# Patient Record
Sex: FEMALE | Race: WHITE | NOT HISPANIC OR LATINO | Employment: UNEMPLOYED | URBAN - METROPOLITAN AREA
[De-identification: names, ages, dates, MRNs, and addresses within clinical notes are randomized per-mention and may not be internally consistent; named-entity substitution may affect disease eponyms.]

---

## 2017-11-06 ENCOUNTER — TELEPHONE (OUTPATIENT)
Dept: CARDIOLOGY | Facility: MEDICAL CENTER | Age: 57
End: 2017-11-06

## 2017-11-07 NOTE — TELEPHONE ENCOUNTER
I called [facility] at [phone] and left a voicemail for the medical assistant for Ramona re: labs, EKG or any other cardiac work up records for the referral. A EKG was done in their office, but not sent with the referral. I asked the medical assistant to call me back.  I did call the patient too and left a generic voicemail asking for a call back to see if the patient had seen a cardiologist in the past. The patient is seeing  11/10/17 @ 1400.SAH.

## 2017-11-08 NOTE — TELEPHONE ENCOUNTER
[facility] called me backMARS for Kelly MARTE and he will send any additional records for the patient's visit with BRIAN.

## 2017-11-08 NOTE — TELEPHONE ENCOUNTER
I called the patient at [phone] (H) and left a generic voicemail for the patient to confirm her appointment with CR on 11/10 @ 1400 and to see if the patient had recent labs done.MARIA ESTHER

## 2017-11-10 ENCOUNTER — OFFICE VISIT (OUTPATIENT)
Dept: CARDIOLOGY | Facility: MEDICAL CENTER | Age: 57
End: 2017-11-10
Payer: MEDICAID

## 2017-11-10 VITALS
HEIGHT: 65 IN | HEART RATE: 92 BPM | DIASTOLIC BLOOD PRESSURE: 82 MMHG | SYSTOLIC BLOOD PRESSURE: 114 MMHG | OXYGEN SATURATION: 97 % | WEIGHT: 121 LBS | BODY MASS INDEX: 20.16 KG/M2 | RESPIRATION RATE: 12 BRPM

## 2017-11-10 DIAGNOSIS — R55 SYNCOPE AND COLLAPSE: ICD-10-CM

## 2017-11-10 LAB — EKG IMPRESSION: NORMAL

## 2017-11-10 PROCEDURE — 99244 OFF/OP CNSLTJ NEW/EST MOD 40: CPT | Performed by: INTERNAL MEDICINE

## 2017-11-10 PROCEDURE — 93000 ELECTROCARDIOGRAM COMPLETE: CPT | Performed by: INTERNAL MEDICINE

## 2017-11-10 ASSESSMENT — ENCOUNTER SYMPTOMS
EYE PAIN: 0
MYALGIAS: 0
HEMOPTYSIS: 0
WHEEZING: 0
TINGLING: 1
NERVOUS/ANXIOUS: 0
BLURRED VISION: 0
SPEECH CHANGE: 0
LOSS OF CONSCIOUSNESS: 0
COUGH: 0
NAUSEA: 0
PALPITATIONS: 0
CHILLS: 0
FEVER: 0
BRUISES/BLEEDS EASILY: 0
EYE DISCHARGE: 0
ABDOMINAL PAIN: 0
INSOMNIA: 1
VOMITING: 0
DEPRESSION: 1

## 2017-11-10 NOTE — PROGRESS NOTES
Subjective:   Jennifer Fox is a 57 y.o. female who presents today for evaluation of syncope    She is seen in consultation at the request of Kristel Mendoza PA-C at [facility] for above issue.    She has depression but other no known major medical issue.    About 5-6 months ago, she had a syncopal episode without much warning.  She woke up on the floor with some headache.  She did not recall palpotations, chest pain, incontinence or other focal weakness or numbness.  There has been no recurrence since.   Unsure if she was taking antidepressant at the time.    She was seen at [facility] last month and referred here.    She is mostly concerned about tingling sensation in her head.    There is no FH of sudden death.  Maternal grandmother had MI but later in life.    Her mother is present during this visit. She denies any cardiac issue.    Taking no medication currently.    Past Medical History:   Diagnosis Date   • Depression    • Migraine      History reviewed. No pertinent surgical history.  History reviewed. No pertinent family history.  History   Smoking Status   • Current Every Day Smoker   • Packs/day: 0.50   Smokeless Tobacco   • Never Used     No Known Allergies  Outpatient Encounter Prescriptions as of 11/10/2017   Medication Sig Dispense Refill   • hydrocodone-acetaminophen (VICODIN ES) 7.5-750 MG per tablet Take 1 Tab by mouth every four hours as needed for Mild Pain (pain). (Patient not taking: Reported on 11/10/2017) 15 Each 0   • ondansetron (ZOFRAN) 4 MG TABS Take 1 Tab by mouth every 8 hours as needed for Nausea/Vomiting. (Patient not taking: Reported on 11/10/2017) 10 Each 1     No facility-administered encounter medications on file as of 11/10/2017.      Review of Systems   Constitutional: Negative for chills and fever.   HENT: Negative for congestion.    Eyes: Negative for blurred vision, pain and discharge.   Respiratory: Negative for cough, hemoptysis and wheezing.    Cardiovascular: Negative  "for chest pain and palpitations.   Gastrointestinal: Negative for abdominal pain, nausea and vomiting.   Musculoskeletal: Negative for joint pain and myalgias.   Skin: Negative for itching and rash.   Neurological: Positive for tingling. Negative for speech change and loss of consciousness.        Left top portion of her head   Endo/Heme/Allergies: Does not bruise/bleed easily.   Psychiatric/Behavioral: Positive for depression. The patient has insomnia. The patient is not nervous/anxious.    All other systems reviewed and are negative.       Objective:   /82   Pulse 92   Resp 12   Ht 1.651 m (5' 5\")   Wt 54.9 kg (121 lb)   SpO2 97%   BMI 20.14 kg/m²     Physical Exam   Constitutional: She is oriented to person, place, and time. No distress.   Thin, NAD   HENT:   Mouth/Throat: Mucous membranes are normal.   Eyes: Conjunctivae and EOM are normal.   Neck: No JVD present. No tracheal deviation present. No thyroid mass and no thyromegaly present.   Cardiovascular: Normal rate, regular rhythm and intact distal pulses.    No murmur heard.  Pulmonary/Chest: Effort normal and breath sounds normal. No respiratory distress. She exhibits no tenderness.   Abdominal: Soft. There is no tenderness.   Musculoskeletal: Normal range of motion. She exhibits no edema.   Neurological: She is alert and oriented to person, place, and time. She has normal strength. She displays no tremor.   Skin: Skin is warm and dry. She is not diaphoretic.   Psychiatric: She has a normal mood and affect. Her behavior is normal.   Vitals reviewed.    EKG today by my review showed sinus rhythm, probable right atrial enlargement, QTc 426    Assessment:     1. Syncope and collapse  EKG    EKG    ECHOCARDIOGRAM COMP W/O CONT       Medical Decision Making:  Today's Assessment / Status / Plan:     She is poor historian. She has not had recurrent syncope or dizziness.  EKG unremarkable. QTc is normal.  Will obtain echocardiography.  Will consider " further testing if it recurs.  Advised to f/u with primary care regarding her tingling symptoms.  We will keep you posted about our findings and further recommendations as they become available. Please also do not hesitate to call for any questions.  Thank you kindly for allowing me to participate in the care of this patient.

## 2017-11-10 NOTE — LETTER
[facility]   [address] [place] [address] [address]  [place] [address]  Phone: [phone]  Fax: [phone]              Jennifer Fox  1960    Encounter Date: 11/10/2017    Heather Miguel M.D.          PROGRESS NOTE:  No notes on file      No Recipients

## 2019-06-23 ENCOUNTER — OFFICE VISIT (OUTPATIENT)
Dept: URGENT CARE | Facility: CLINIC | Age: 59
End: 2019-06-23
Payer: MEDICAID

## 2019-06-23 VITALS
SYSTOLIC BLOOD PRESSURE: 120 MMHG | HEART RATE: 82 BPM | WEIGHT: 112 LBS | BODY MASS INDEX: 20.61 KG/M2 | HEIGHT: 62 IN | DIASTOLIC BLOOD PRESSURE: 70 MMHG | OXYGEN SATURATION: 99 % | RESPIRATION RATE: 12 BRPM | TEMPERATURE: 97.7 F

## 2019-06-23 DIAGNOSIS — G43.109 MIGRAINE WITH AURA AND WITHOUT STATUS MIGRAINOSUS, NOT INTRACTABLE: Primary | ICD-10-CM

## 2019-06-23 PROCEDURE — 99214 OFFICE O/P EST MOD 30 MIN: CPT | Performed by: PHYSICIAN ASSISTANT

## 2019-06-23 RX ORDER — ONDANSETRON 2 MG/ML
4 INJECTION INTRAMUSCULAR; INTRAVENOUS ONCE
Status: COMPLETED | OUTPATIENT
Start: 2019-06-23 | End: 2019-06-23

## 2019-06-23 RX ORDER — KETOROLAC TROMETHAMINE 30 MG/ML
30 INJECTION, SOLUTION INTRAMUSCULAR; INTRAVENOUS ONCE
Status: COMPLETED | OUTPATIENT
Start: 2019-06-23 | End: 2019-06-23

## 2019-06-23 RX ORDER — ONDANSETRON 4 MG/1
4 TABLET, FILM COATED ORAL EVERY 4 HOURS PRN
Qty: 20 TAB | Refills: 0 | Status: SHIPPED | OUTPATIENT
Start: 2019-06-23 | End: 2019-06-28

## 2019-06-23 RX ORDER — IBUPROFEN 200 MG
200 TABLET ORAL EVERY 6 HOURS PRN
COMMUNITY
End: 2019-06-23

## 2019-06-23 RX ORDER — DIPHENHYDRAMINE HYDROCHLORIDE 50 MG/ML
25 INJECTION INTRAMUSCULAR; INTRAVENOUS ONCE
Status: COMPLETED | OUTPATIENT
Start: 2019-06-23 | End: 2019-06-23

## 2019-06-23 RX ADMIN — DIPHENHYDRAMINE HYDROCHLORIDE 25 MG: 50 INJECTION INTRAMUSCULAR; INTRAVENOUS at 13:06

## 2019-06-23 RX ADMIN — KETOROLAC TROMETHAMINE 30 MG: 30 INJECTION, SOLUTION INTRAMUSCULAR; INTRAVENOUS at 13:07

## 2019-06-23 RX ADMIN — ONDANSETRON 4 MG: 2 INJECTION INTRAMUSCULAR; INTRAVENOUS at 13:07

## 2019-06-23 ASSESSMENT — ENCOUNTER SYMPTOMS
VOMITING: 1
EYE REDNESS: 0
COUGH: 0
HEADACHES: 1
NAUSEA: 1
SORE THROAT: 0
PHOTOPHOBIA: 1
ABDOMINAL PAIN: 0
MIGRAINE HEADACHES: 1
EYE DISCHARGE: 0
ANOREXIA: 0
SINUS PRESSURE: 1
CONSTIPATION: 0
SHORTNESS OF BREATH: 0
DOUBLE VISION: 0
EYE PAIN: 0
DIARRHEA: 0
BLURRED VISION: 0
FEVER: 0
CHILLS: 0

## 2019-06-23 NOTE — PROGRESS NOTES
Subjective:   Jennifer Fox is a 58 y.o. female who presents for Migraine (x 5 days. Migraine and pain radiating to L side of neck. )        Headache    This is a new problem. Episode onset: 5 days. The problem occurs intermittently. The problem has been waxing and waning. The pain is located in the left unilateral region. The pain quality is similar to prior headaches. The pain is moderate. Associated symptoms include ear pain, hearing loss, nausea, photophobia, sinus pressure and vomiting. Pertinent negatives include no abdominal pain, anorexia, blurred vision, coughing, eye pain, eye redness, fever, phonophobia or sore throat. Treatments tried: ibuprofen 1000mg Q4 hours  Her past medical history is significant for migraine headaches and sinus disease. There is no history of obesity or recent head traumas.     Review of Systems   Constitutional: Negative for chills, fever and malaise/fatigue.   HENT: Positive for ear pain, hearing loss and sinus pressure. Negative for sore throat.    Eyes: Positive for photophobia. Negative for blurred vision, double vision, pain, discharge and redness.   Respiratory: Negative for cough and shortness of breath.    Gastrointestinal: Positive for nausea and vomiting. Negative for abdominal pain, anorexia, constipation and diarrhea.   Neurological: Positive for headaches.   All other systems reviewed and are negative.      PMH:  has a past medical history of Depression and Migraine. She also has no past medical history of Diabetes (HCC); Hyperlipidemia; or Hypertension.  MEDS:   Current Outpatient Prescriptions:   •  ondansetron (ZOFRAN) 4 MG Tab tablet, Take 1 Tab by mouth every four hours as needed for Nausea/Vomiting for up to 5 days., Disp: 20 Tab, Rfl: 0  ALLERGIES: No Known Allergies  SURGHX: History reviewed. No pertinent surgical history.  SOCHX:  reports that she has been smoking.  She has been smoking about 0.50 packs per day. She has never used smokeless tobacco. She  "reports that she uses drugs, including Marijuana. She reports that she does not drink alcohol.  Family History   Problem Relation Age of Onset   • Heart Attack Maternal Grandmother    • Hypertension Maternal Grandmother         Objective:   /70   Pulse 82   Temp 36.5 °C (97.7 °F) (Temporal)   Resp 12   Ht 1.575 m (5' 2\")   Wt 50.8 kg (112 lb)   SpO2 99%   BMI 20.49 kg/m²     Physical Exam   Constitutional: She is oriented to person, place, and time. She appears well-developed and well-nourished. No distress.   HENT:   Head: Normocephalic and atraumatic.   Right Ear: External ear normal.   Left Ear: External ear normal.   Nose: Nose normal.   Mouth/Throat: Uvula is midline. Abnormal dentition.   Eyes: Pupils are equal, round, and reactive to light. Conjunctivae and EOM are normal.   Neck: Normal range of motion. Neck supple. No tracheal deviation present.   Cardiovascular: Normal rate and regular rhythm.    Pulmonary/Chest: Effort normal and breath sounds normal.   Neurological: She is alert and oriented to person, place, and time. She has normal strength. She is not disoriented. She displays no atrophy. No cranial nerve deficit or sensory deficit. She exhibits normal muscle tone. Coordination normal.   Skin: Skin is warm and dry. Capillary refill takes less than 2 seconds.   Psychiatric: She has a normal mood and affect. Her behavior is normal.   Vitals reviewed.        Assessment/Plan:     1. Migraine with aura and without status migrainosus, not intractable  ketorolac (TORADOL) injection 30 mg    ondansetron (ZOFRAN) syringe/vial injection 4 mg    diphenhydrAMINE (BENADRYL) injection 25 mg    ondansetron (ZOFRAN) 4 MG Tab tablet     Toradol, diphenhydramine, Zofran administered in office.  Patient placed in a cool dark room 15 minutes.  Symptoms slightly improved.  Advised patient to return home and rest in a cool dark room.  Educational handout on migraine provided.    Follow-up with primary care " provider within 7-10 days.  If symptoms worsen or persist patient can return to clinic immediately for reevaluation.  Red flags and strict emergency room precautions discussed. All side effects of medication discussed including allergic response, GI upset, tendon injury, etc. Patient and mother verbalized understanding of information.    Please note that this dictation was created using voice recognition software. I have made every reasonable attempt to correct obvious errors, but I expect that there are errors of grammar and possibly content that I did not discover before finalizing the note.

## 2023-02-13 ENCOUNTER — APPOINTMENT (OUTPATIENT)
Dept: MEDICAL GROUP | Facility: CLINIC | Age: 63
End: 2023-02-13
Payer: MEDICAID

## 2023-03-27 ENCOUNTER — OFFICE VISIT (OUTPATIENT)
Dept: MEDICAL GROUP | Facility: CLINIC | Age: 63
End: 2023-03-27
Payer: MEDICAID

## 2023-03-27 DIAGNOSIS — H53.8 BLURRY VISION, BILATERAL: ICD-10-CM

## 2023-03-27 DIAGNOSIS — Z11.59 ENCOUNTER FOR HEPATITIS C SCREENING TEST FOR LOW RISK PATIENT: ICD-10-CM

## 2023-03-27 DIAGNOSIS — R45.89 DEPRESSED MOOD: ICD-10-CM

## 2023-03-27 DIAGNOSIS — Z13.220 SCREENING FOR LIPID DISORDERS: ICD-10-CM

## 2023-03-27 PROCEDURE — 99213 OFFICE O/P EST LOW 20 MIN: CPT | Mod: GE

## 2023-03-27 ASSESSMENT — ANXIETY QUESTIONNAIRES
1. FEELING NERVOUS, ANXIOUS, OR ON EDGE: SEVERAL DAYS
6. BECOMING EASILY ANNOYED OR IRRITABLE: SEVERAL DAYS
2. NOT BEING ABLE TO STOP OR CONTROL WORRYING: MORE THAN HALF THE DAYS
7. FEELING AFRAID AS IF SOMETHING AWFUL MIGHT HAPPEN: SEVERAL DAYS
GAD7 TOTAL SCORE: 7
3. WORRYING TOO MUCH ABOUT DIFFERENT THINGS: SEVERAL DAYS
5. BEING SO RESTLESS THAT IT IS HARD TO SIT STILL: NOT AT ALL
IF YOU CHECKED OFF ANY PROBLEMS ON THIS QUESTIONNAIRE, HOW DIFFICULT HAVE THESE PROBLEMS MADE IT FOR YOU TO DO YOUR WORK, TAKE CARE OF THINGS AT HOME, OR GET ALONG WITH OTHER PEOPLE: VERY DIFFICULT
4. TROUBLE RELAXING: SEVERAL DAYS

## 2023-03-27 ASSESSMENT — PATIENT HEALTH QUESTIONNAIRE - PHQ9
5. POOR APPETITE OR OVEREATING: 0 - NOT AT ALL
SUM OF ALL RESPONSES TO PHQ QUESTIONS 1-9: 5
CLINICAL INTERPRETATION OF PHQ2 SCORE: 2

## 2023-03-27 NOTE — PROGRESS NOTES
"Subjective:     CC:   Chief Complaint   Patient presents with    Annual Exam     Bad Headaches when stressed, gets dizzy, mood swings, wants to get a diabetic test done.       HPI:   Jennifer presents today to establish care:    #Blurry Vision   Patient has had progressively worsening vision over the last 3 years. Went to eye doctor, told she had worsening vision and should get diabetes test. No curtains, flashes of lights, floaters, complete loss of vision.    #Depression  #Anxiety  Patient states she will sleep a lot. She lays in bed in order not to cope with stresses of encountering people. She feels like she is all alone as her   10 years ago, father has passed away, brother passed away 1 year ago. Has tried medication in the past for depression and sleep in the past. Has had SI in the past but no active SI currently. Some passive SI. Does not have access to firearms.     #Headaches  Patient has episodes of headache. Sometimes right-sided, sometimes full head. Associated with nausea/dizziness. Not sure what triggers them. Sometimes related with emotions. Gets spots in her vision, \"tunnel vision.\" Not sure how often they are happening. Mother of patient states these episodes occur 2-3 times a week.     Has been on medication for migraines in the past.     Not worse headache of her life, gets better after being in dark room and sleeping. No weakness, acute change of vision, change of speech, loss of balance.     #Social  Lives alone   Marijuana daily   Smokes 1/4 pack a day since teenager ~11 pack year history   Drinks once a year   No other drugs   No IVDU history    No problems updated.    No current Epic-ordered outpatient medications on file.     No current [facility]-ordered facility-administered medications on file.       ROS:  Negative except for above in HPI    Objective:     Exam:  BP (P) 108/86 (BP Location: Left arm, Patient Position: Sitting, BP Cuff Size: Adult)   Pulse (!) (P) 103   Temp (P) 36.9 " "°C (98.4 °F) (Temporal)   Ht (P) 1.651 m (5' 5\")   Wt (P) 54.1 kg (119 lb 3.2 oz)   SpO2 (P) 96%   BMI (P) 19.84 kg/m²  Body mass index is 19.84 kg/m² (pended).    Gen: Alert and oriented, No apparent distress.  HEENT: NCAT, MMM, No lymphadenopathy  Lungs: Normal effort, CTA bilaterally, no wheezes, rhonchi, or rales  CV: Regular rate and rhythm on exam. No murmurs, rubs, or gallops. Radial pulses palpable bilat  Abd: Soft, non-distended, no guarding, no rebound, non-tender to palpation  Ext: No clubbing, cyanosis, edema.  Neuro: Non-focal    Labs: No new labs    Assessment & Plan:     62 y.o. female with the following -     #Depression  #Anxiety  Patient with reports of depression, lifelong. Related to death of family members. No active SI, some passive SI. No intent to harm self or others. Discussed medication vs therapy vs combination. Patient has tried SSRIs in the past and had negative side effects. Open to therapy.   -Crisis line provided   -ED/RBH precautions given   -Patient to schedule with Dr. Diaz  -Discussed daily marijuana and worsening of mental health  -Labs as below     #Headaches  Likely migraines. Chronic. No red flag signs.   -Will discuss at follow-up  -ED precautions given     #HCM  Patient never screening for hepatitis. No IVDU history.   -Labs as below    #Well Woman Exam  Unknown patient sexual, reproductive history  -To schedule follow-up well woman exam     Problem List Items Addressed This Visit    None  Visit Diagnoses       Depressed mood        Relevant Orders    TSH WITH REFLEX TO FT4    Blurry vision, bilateral        Relevant Orders    CBC WITH DIFFERENTIAL    Comp Metabolic Panel    HEMOGLOBIN A1C    Encounter for hepatitis C screening test for low risk patient        Relevant Orders    HEPATITIS PANEL ACUTE(4 COMPONENTS)    HEP C VIRUS ANTIBODY    Screening for lipid disorders        Relevant Orders    Lipid Profile            No follow-ups on file.        "

## 2023-04-14 ENCOUNTER — TELEPHONE (OUTPATIENT)
Dept: BEHAVIORAL HEALTH | Facility: PSYCHIATRIC FACILITY | Age: 63
End: 2023-04-14
Payer: MEDICAID

## 2023-04-14 ENCOUNTER — HOSPITAL ENCOUNTER (EMERGENCY)
Facility: MEDICAL CENTER | Age: 63
End: 2023-04-14
Attending: EMERGENCY MEDICINE
Payer: MEDICAID

## 2023-04-14 VITALS
HEART RATE: 88 BPM | TEMPERATURE: 97.9 F | RESPIRATION RATE: 16 BRPM | WEIGHT: 119 LBS | OXYGEN SATURATION: 96 % | HEIGHT: 65 IN | SYSTOLIC BLOOD PRESSURE: 139 MMHG | DIASTOLIC BLOOD PRESSURE: 83 MMHG | BODY MASS INDEX: 19.83 KG/M2

## 2023-04-14 DIAGNOSIS — F32.A DEPRESSION, UNSPECIFIED DEPRESSION TYPE: ICD-10-CM

## 2023-04-14 DIAGNOSIS — R51.9 ACUTE NONINTRACTABLE HEADACHE, UNSPECIFIED HEADACHE TYPE: ICD-10-CM

## 2023-04-14 LAB
ALBUMIN SERPL BCP-MCNC: 4.2 G/DL (ref 3.2–4.9)
ALBUMIN/GLOB SERPL: 1.3 G/DL
ALP SERPL-CCNC: 82 U/L (ref 30–99)
ALT SERPL-CCNC: 8 U/L (ref 2–50)
ANION GAP SERPL CALC-SCNC: 11 MMOL/L (ref 7–16)
AST SERPL-CCNC: 14 U/L (ref 12–45)
BASOPHILS # BLD AUTO: 1 % (ref 0–1.8)
BASOPHILS # BLD: 0.07 K/UL (ref 0–0.12)
BILIRUB SERPL-MCNC: 0.6 MG/DL (ref 0.1–1.5)
BUN SERPL-MCNC: 16 MG/DL (ref 8–22)
CALCIUM ALBUM COR SERPL-MCNC: 9.4 MG/DL (ref 8.5–10.5)
CALCIUM SERPL-MCNC: 9.6 MG/DL (ref 8.5–10.5)
CHLORIDE SERPL-SCNC: 104 MMOL/L (ref 96–112)
CO2 SERPL-SCNC: 23 MMOL/L (ref 20–33)
CREAT SERPL-MCNC: 1.23 MG/DL (ref 0.5–1.4)
EOSINOPHIL # BLD AUTO: 0.08 K/UL (ref 0–0.51)
EOSINOPHIL NFR BLD: 1.2 % (ref 0–6.9)
ERYTHROCYTE [DISTWIDTH] IN BLOOD BY AUTOMATED COUNT: 47.5 FL (ref 35.9–50)
GFR SERPLBLD CREATININE-BSD FMLA CKD-EPI: 50 ML/MIN/1.73 M 2
GLOBULIN SER CALC-MCNC: 3.2 G/DL (ref 1.9–3.5)
GLUCOSE SERPL-MCNC: 90 MG/DL (ref 65–99)
HCT VFR BLD AUTO: 45.6 % (ref 37–47)
HGB BLD-MCNC: 15.2 G/DL (ref 12–16)
IMM GRANULOCYTES # BLD AUTO: 0.02 K/UL (ref 0–0.11)
IMM GRANULOCYTES NFR BLD AUTO: 0.3 % (ref 0–0.9)
LYMPHOCYTES # BLD AUTO: 1.93 K/UL (ref 1–4.8)
LYMPHOCYTES NFR BLD: 27.8 % (ref 22–41)
MCH RBC QN AUTO: 31.9 PG (ref 27–33)
MCHC RBC AUTO-ENTMCNC: 33.3 G/DL (ref 33.6–35)
MCV RBC AUTO: 95.6 FL (ref 81.4–97.8)
MONOCYTES # BLD AUTO: 0.54 K/UL (ref 0–0.85)
MONOCYTES NFR BLD AUTO: 7.8 % (ref 0–13.4)
NEUTROPHILS # BLD AUTO: 4.31 K/UL (ref 2–7.15)
NEUTROPHILS NFR BLD: 61.9 % (ref 44–72)
NRBC # BLD AUTO: 0 K/UL
NRBC BLD-RTO: 0 /100 WBC
PLATELET # BLD AUTO: 198 K/UL (ref 164–446)
PMV BLD AUTO: 11.5 FL (ref 9–12.9)
POTASSIUM SERPL-SCNC: 3.9 MMOL/L (ref 3.6–5.5)
PROT SERPL-MCNC: 7.4 G/DL (ref 6–8.2)
RBC # BLD AUTO: 4.77 M/UL (ref 4.2–5.4)
SODIUM SERPL-SCNC: 138 MMOL/L (ref 135–145)
TSH SERPL DL<=0.005 MIU/L-ACNC: 1.25 UIU/ML (ref 0.38–5.33)
WBC # BLD AUTO: 7 K/UL (ref 4.8–10.8)

## 2023-04-14 PROCEDURE — 700101 HCHG RX REV CODE 250: Performed by: EMERGENCY MEDICINE

## 2023-04-14 PROCEDURE — 85025 COMPLETE CBC W/AUTO DIFF WBC: CPT

## 2023-04-14 PROCEDURE — 99284 EMERGENCY DEPT VISIT MOD MDM: CPT

## 2023-04-14 PROCEDURE — 84443 ASSAY THYROID STIM HORMONE: CPT

## 2023-04-14 PROCEDURE — 700102 HCHG RX REV CODE 250 W/ 637 OVERRIDE(OP): Performed by: EMERGENCY MEDICINE

## 2023-04-14 PROCEDURE — 700105 HCHG RX REV CODE 258: Performed by: EMERGENCY MEDICINE

## 2023-04-14 PROCEDURE — 80053 COMPREHEN METABOLIC PANEL: CPT

## 2023-04-14 PROCEDURE — 36415 COLL VENOUS BLD VENIPUNCTURE: CPT

## 2023-04-14 PROCEDURE — 96375 TX/PRO/DX INJ NEW DRUG ADDON: CPT

## 2023-04-14 PROCEDURE — 96365 THER/PROPH/DIAG IV INF INIT: CPT

## 2023-04-14 PROCEDURE — 700111 HCHG RX REV CODE 636 W/ 250 OVERRIDE (IP): Performed by: EMERGENCY MEDICINE

## 2023-04-14 PROCEDURE — A9270 NON-COVERED ITEM OR SERVICE: HCPCS | Performed by: EMERGENCY MEDICINE

## 2023-04-14 RX ORDER — MIDAZOLAM HYDROCHLORIDE 1 MG/ML
0.5 INJECTION INTRAMUSCULAR; INTRAVENOUS ONCE
Status: COMPLETED | OUTPATIENT
Start: 2023-04-14 | End: 2023-04-14

## 2023-04-14 RX ORDER — METOCLOPRAMIDE HYDROCHLORIDE 5 MG/ML
10 INJECTION INTRAMUSCULAR; INTRAVENOUS ONCE
Status: COMPLETED | OUTPATIENT
Start: 2023-04-14 | End: 2023-04-14

## 2023-04-14 RX ORDER — ACETAMINOPHEN 500 MG
1000 TABLET ORAL ONCE
Status: COMPLETED | OUTPATIENT
Start: 2023-04-14 | End: 2023-04-14

## 2023-04-14 RX ORDER — KETOROLAC TROMETHAMINE 30 MG/ML
15 INJECTION, SOLUTION INTRAMUSCULAR; INTRAVENOUS ONCE
Status: COMPLETED | OUTPATIENT
Start: 2023-04-14 | End: 2023-04-14

## 2023-04-14 RX ADMIN — ACETAMINOPHEN 1000 MG: 500 TABLET, FILM COATED ORAL at 17:26

## 2023-04-14 RX ADMIN — MIDAZOLAM HYDROCHLORIDE 0.5 MG: 1 INJECTION, SOLUTION INTRAMUSCULAR; INTRAVENOUS at 16:26

## 2023-04-14 RX ADMIN — METOCLOPRAMIDE 10 MG: 5 INJECTION, SOLUTION INTRAMUSCULAR; INTRAVENOUS at 17:26

## 2023-04-14 RX ADMIN — KETAMINE HYDROCHLORIDE 25 MG: 10 INJECTION INTRAMUSCULAR; INTRAVENOUS at 16:27

## 2023-04-14 RX ADMIN — KETOROLAC TROMETHAMINE 15 MG: 30 INJECTION, SOLUTION INTRAMUSCULAR at 17:36

## 2023-04-14 NOTE — TELEPHONE ENCOUNTER
"Pt called 1:16pm, was trying to reach her provider at [facility] however somehow got routed to Behavioral Health/Psychiatry.   Pt called c/o actively having a mental break down. Pt was very emotional on the phone, stated she suffers from PTSD, grief, depression and emotional ups and downs since loosing her  and father w/in the past couple of years. Pt stated she has had suicidal thoughts in the past (right after loosing her ), however not having any SI today, nor has she thought of a plan/method. Pt stated she has never acted on her thoughts because she has \"her dogs and granddaughter to live for.\" Pt was asking for help and what she could do as she could \"not keep going on like this\". Pt stated both her mother and boyfriend were at home with her. I asked her if she was comfortable enough having her mother take her to the ED for an eval or if she was unable to we could call 911 and have her transported there. Pt stated he trusted her mom and would like for her to take her. I spoke with pt's mom (Diana Mac) and she said she would take her directly to [facility] ED as long as \"that was what her daughter wanted\". Jennifer re-iterated to her mom that she wants to go and didn't want to wait any longer.     Prior to hanging up the phone Jennifer was thankful for someone talking with her and stated she was on her way to the ED.   "

## 2023-04-14 NOTE — ED TRIAGE NOTES
"Chief Complaint   Patient presents with    Depression     \"I just feel like giving up\" - pt denies SI     Pt to triage with mother for above complaints. Pt states she has lost everyone in her life and feels like giving up . Pt denies SI but does not elaborate further on when she means by giving up. Pt states she just wants to sleep all day.     Pt is alert and oriented, speaking in full sentences, follows commands and responds appropriately to questions.      Pt placed in lobby. Pt educated on triage process and apologized for wait time. Pt encouraged to alert staff for any changes.     Patient and staff wearing appropriate PPE      BP (!) 156/104   Pulse (!) 102   Temp 36.6 °C (97.8 °F) (Temporal)   Resp 18   Ht 1.651 m (5' 5\")   Wt 54 kg (119 lb)   SpO2 98%       "

## 2023-04-14 NOTE — DISCHARGE PLANNING
"Alert Team:    PT accompanied by mother. PT reports depression, however, denies SI. Pt tearful and reports that she is willing to \"try anything to feel better.\" Pt and pt's mother given mental health/psychiatry/counseling resources. Pt's mother reported that pt has appointment to see PCP on 04/19/23 and that PCP will mostly refer to counseling and psychiatry.    Primitivo Tran RN  "

## 2023-04-14 NOTE — ED PROVIDER NOTES
"  ER Provider Note    Scribed for Obi Marks M.d. by Spencer Ramirez. 4/14/2023  3:21 PM    Primary Care Provider: Nayeli Elizabeth M.D.    CHIEF COMPLAINT  Chief Complaint   Patient presents with    Depression     \"I just feel like giving up\" - pt denies SI     EXTERNAL RECORDS REVIEWED  Outpatient Notes patient seen 3/27/2023 for depression, has referral to follow-up with psychiatry    HPI/ROS  LIMITATION TO HISTORY   Select: : None  OUTSIDE HISTORIAN(S):  Parent mother who provided details on patient's home life    Jennifer Fox is a 62 y.o. female who presents to the ED complaining of depression.  She reports history of suicidal thoughts for 10 years after her  passed away. She reports her  and brother passed away 10 years ago. She endorses suicidal ideation in the past, but denies any current suicidal ideation. Patient reports she wants to \"give up\". She was evaluated by her PCP last month for her depression. She notes she was prescribed sleeping pills in the past, but states she is allergic to them. Patient is open to therapy. Patient reports constantly being yelled at by her boyfriend and endorses associated chest pain and anxiety. Mother reports patient has been stressed out lately due to a water leakage in her home. She reports smoking marijuana, but denies alcohol use or other drug use.     PAST MEDICAL HISTORY  Past Medical History:   Diagnosis Date    Depression     Migraine        SURGICAL HISTORY  No past surgical history on file.    FAMILY HISTORY  Family History   Problem Relation Age of Onset    Heart Attack Maternal Grandmother     Hypertension Maternal Grandmother        SOCIAL HISTORY   reports that she has been smoking. She has been smoking an average of .5 packs per day. She has never used smokeless tobacco. She reports current drug use. Drug: Marijuana. She reports that she does not drink alcohol.    CURRENT MEDICATIONS  There are no discharge medications for this " "patient.      ALLERGIES  Patient has no known allergies.    PHYSICAL EXAM  BP (!) 156/104   Pulse (!) 102   Temp 36.6 °C (97.8 °F) (Temporal)   Resp 18   Ht 1.651 m (5' 5\")   Wt 54 kg (119 lb)   SpO2 98%   BMI 19.80 kg/m²   Gen: Alert, no acute distress  HEENT: ATNC  Neck: trachea midline  Resp: no respiratory distress  CV: No JVD, regular rate and rhythm    Abd: non-distended, soft, nontender  Ext: No deformities  Psych: Denies SI or HI flat affect.  Neuro: speech fluent, moves all extremities,, GCS 15, cranial nerves II through XII grossly intact    DIAGNOSTIC STUDIES    Labs:   Labs Reviewed   CBC WITH DIFFERENTIAL - Abnormal; Notable for the following components:       Result Value    MCHC 33.3 (*)     All other components within normal limits   ESTIMATED GFR - Abnormal; Notable for the following components:    GFR (CKD-EPI) 50 (*)     All other components within normal limits   TSH WITH REFLEX TO FT4   COMP METABOLIC PANEL   CORRECTED CALCIUM       COURSE & MEDICAL DECISION MAKING     ED Observation Status? Yes; I am placing the patient in to an observation status due to a diagnostic uncertainty as well as therapeutic intensity. Patient placed in observation status at 3:22 PM, 4/14/2023.     Observation plan is as follows: Diagnostic studies and symptom management    Upon Reevaluation, the patient's condition has: Improved; and will be discharged.    Patient discharged from ED Observation status at 5:15 PM (Time) (4/14/2023) (Date).     INITIAL ASSESSMENT, COURSE AND PLAN  Care Narrative: Patient presents with depression and thoughts of giving up.  No active suicidal ideation.  Does not meet criteria for legal hold at this time.  She has been having difficulty obtaining her labs, will obtain the labs of CBC, CMP, TSH to evaluate for organic etiology for her presentation, however shows no signs of anemia, thyroid dysfunction, electrolyte abnormalities.    Patient was amenable to ketamine treatment and was " provided with this.  She did have a headache afterwards which was treated.  She demonstrates no neurologic dysfunction to suggest intracranial mass, subarachnoid hemorrhage, venous sinus thrombosis.    3:22 PM - Patient was evaluated at bedside. Ordered labs to evaluate. Patient will be treated with ketamine 25 mg and Versed 0.5 mg. Patient and/or family verbalizes understanding to the plan of care.     3:32 PM I discussed the patient's case and the above findings with Behavioral Health who agrees to evaluate the patient at bedside.     5:14 PM - Patient was reevaluated at bedside. Patient is currently sleeping but arousable. Discussed lab radiology results with the patient and/or family. The plan for discharge was discussed. Patient and/or family was given the opportunity to ask any questions. Patient and/or family verbalizes understanding and agreement to this plan of care.           DISPOSITION AND DISCUSSIONS  I have discussed management of the patient with the following physicians and SHONNA's:  None    Discussion of management with other [facility] or appropriate source(s): Behavioral Health will help arrange outpatient follow-up        The patient will return for new or worsening symptoms and is stable at the time of discharge.    The patient is referred to a primary physician for blood pressure management, diabetic screening, and for all other preventative health concerns.    DISPOSITION:  Patient will be discharged home in stable condition.    FOLLOW UP:  Nayeli Elizabeth M.D.  [address] [facility] [id number] [phone]    Schedule an appointment as soon as possible for a visit       [facility], Emergency Dept  [address] [facility] [address] [phone]    If symptoms worsen      FINAL DIANGOSIS  1. Depression, unspecified depression type    2. Acute nonintractable headache, unspecified headache type         I, Spencer Ramirez (Scribe), myrna scribing for, and in the presence of, Obi  FAYE Marks M.D..    Electronically signed by: Spencer Ramirez (Scribe), 4/14/2023    IObi M.D. personally performed the services described in this documentation, as scribed by Spencer Ramirez in my presence, and it is both accurate and complete.      The note accurately reflects work and decisions made by me.  Obi Marks M.D.  4/14/2023  10:43 PM

## 2023-04-15 NOTE — DISCHARGE INSTRUCTIONS
You were evaluated today for depression.  Your physical exam and labs were reassuring. You were medically cleared in the emergency department, and have been given for outpatient resources.  You were treated with a dose of ketamine, which helps many people with their depression and may last for several weeks.    You also had a headache which was treated with nonopioid pain medications.    If you have thoughts of suicide, please seek help. This may be a family member, friend, mental health professional, suicide hotline, or any emergency department.     National Crisis hotline: 987  Available 24/7    Please return to the ED or seek medical attention if you develop:  Fever, severe headache, vomiting, thoughts of suicide or other concerning findings

## 2023-04-17 ENCOUNTER — APPOINTMENT (OUTPATIENT)
Dept: LAB | Facility: MEDICAL CENTER | Age: 63
End: 2023-04-17
Payer: MEDICAID

## 2023-04-17 LAB — HBA1C MFR BLD: 5.2 % (ref ?–5.8)

## 2023-04-19 ENCOUNTER — OFFICE VISIT (OUTPATIENT)
Dept: MEDICAL GROUP | Facility: CLINIC | Age: 63
End: 2023-04-19
Payer: MEDICAID

## 2023-04-19 VITALS
SYSTOLIC BLOOD PRESSURE: 130 MMHG | BODY MASS INDEX: 20.01 KG/M2 | TEMPERATURE: 97.8 F | WEIGHT: 120.1 LBS | HEIGHT: 65 IN | OXYGEN SATURATION: 97 % | DIASTOLIC BLOOD PRESSURE: 84 MMHG | HEART RATE: 88 BPM

## 2023-04-19 DIAGNOSIS — N18.31 STAGE 3A CHRONIC KIDNEY DISEASE (CKD): ICD-10-CM

## 2023-04-19 DIAGNOSIS — F32.A DEPRESSION, UNSPECIFIED DEPRESSION TYPE: ICD-10-CM

## 2023-04-19 DIAGNOSIS — E78.00 HYPERCHOLESTEREMIA: ICD-10-CM

## 2023-04-19 PROCEDURE — 99213 OFFICE O/P EST LOW 20 MIN: CPT | Mod: GE

## 2023-04-19 ASSESSMENT — ANXIETY QUESTIONNAIRES
IF YOU CHECKED OFF ANY PROBLEMS ON THIS QUESTIONNAIRE, HOW DIFFICULT HAVE THESE PROBLEMS MADE IT FOR YOU TO DO YOUR WORK, TAKE CARE OF THINGS AT HOME, OR GET ALONG WITH OTHER PEOPLE: VERY DIFFICULT
2. NOT BEING ABLE TO STOP OR CONTROL WORRYING: NEARLY EVERY DAY
5. BEING SO RESTLESS THAT IT IS HARD TO SIT STILL: SEVERAL DAYS
7. FEELING AFRAID AS IF SOMETHING AWFUL MIGHT HAPPEN: SEVERAL DAYS
1. FEELING NERVOUS, ANXIOUS, OR ON EDGE: SEVERAL DAYS
4. TROUBLE RELAXING: SEVERAL DAYS
GAD7 TOTAL SCORE: 11
6. BECOMING EASILY ANNOYED OR IRRITABLE: MORE THAN HALF THE DAYS
3. WORRYING TOO MUCH ABOUT DIFFERENT THINGS: MORE THAN HALF THE DAYS

## 2023-04-19 ASSESSMENT — PATIENT HEALTH QUESTIONNAIRE - PHQ9
SUM OF ALL RESPONSES TO PHQ QUESTIONS 1-9: 11
5. POOR APPETITE OR OVEREATING: 1 - SEVERAL DAYS
CLINICAL INTERPRETATION OF PHQ2 SCORE: 5

## 2023-04-19 ASSESSMENT — FIBROSIS 4 INDEX: FIB4 SCORE: 1.499923475244191718

## 2023-04-19 NOTE — PROGRESS NOTES
"Subjective:     CC:   Chief Complaint   Patient presents with    Lab Results       HPI:   Jennifer presents today with:    For follow up from emergency room. States she was on the \"verge of a nervous breakdown.\"     Patient states she called here to speak with psychologist and was told to go to ED for evaluation which is why she went. Was not thinking of killing herself or harming anyone else. Was treated with Ketamine in the ED on Friday with a positive response.      Patient presents today to discuss Ketamine treatment for anxiety and depression. States she previously took SSRIs with a side effect of suicidal thoughts ~6 years ago and stopped taking the medications at that point in time.     Today, patient reports passive SI. No intent to harm or kill herself or others. No access to weapons. Reports mom and granddaughter as protective factors.     Problem   Stage 3a Chronic Kidney Disease (Ckd) (Hcc)   Hypercholesteremia       No current Epic-ordered outpatient medications on file.     No current Epic-ordered facility-administered medications on file.       ROS:  Negative except for above in HPI    Objective:     Exam:  /84 (BP Location: Right arm, Patient Position: Sitting, BP Cuff Size: Adult)   Pulse 88   Temp 36.6 °C (97.8 °F) (Temporal)   Ht 1.651 m (5' 5\")   Wt 54.5 kg (120 lb 1.6 oz)   SpO2 97%   BMI 19.99 kg/m²  Body mass index is 19.99 kg/m².    Gen: Alert and oriented, No apparent distress.  HEENT: NCAT, MMM, No lymphadenopathy  Lungs: Normal effort, CTA bilaterally, no wheezes, rhonchi, or rales  CV: Regular rate and rhythm. No murmurs, rubs, or gallops. Radial pulses palpable bilat  Ext: No clubbing, cyanosis, edema.  Neuro: Non-focal    Labs: No new labs    Assessment & Plan:     62 y.o. female with the following -     #Anxiety   #Depression   Patient with PHQ-9 of 11 and ANGELES-7 of 11. Passive SI infrequently per patient with no plan or intent for self harm or harm to others. Protective " factors identified. Discussed options for treatment. Patient would like to try ketamine treatment given her failure of SSRIs in the past.   -Referral to psychiatry for discussion of Ketamine therapy   -To schedule with psychologist at earliest convenience   -Discussed exercise, mindfulness, stress reduction. Educational materials provided     Follow in 1 month on above and lab results    Problem List Items Addressed This Visit       Stage 3a chronic kidney disease (CKD) (HCC)    Hypercholesteremia     Other Visit Diagnoses       Depression, unspecified depression type        Relevant Orders    Referral to Psychiatry            No follow-ups on file.

## 2023-04-19 NOTE — PATIENT INSTRUCTIONS
Referral to psychiatry sent, if you are not called in 2 weeks please reach out to phone number provided on referral form     Schedule with psychologist (Dr. Diaz) at      Follow in 2-4 weeks for mood

## 2023-07-09 ENCOUNTER — APPOINTMENT (OUTPATIENT)
Dept: RADIOLOGY | Facility: IMAGING CENTER | Age: 63
End: 2023-07-09
Attending: PHYSICIAN ASSISTANT
Payer: MEDICAID

## 2023-07-09 ENCOUNTER — OFFICE VISIT (OUTPATIENT)
Dept: URGENT CARE | Facility: CLINIC | Age: 63
End: 2023-07-09
Payer: MEDICAID

## 2023-07-09 VITALS
TEMPERATURE: 98.4 F | RESPIRATION RATE: 16 BRPM | HEART RATE: 83 BPM | WEIGHT: 110 LBS | SYSTOLIC BLOOD PRESSURE: 108 MMHG | DIASTOLIC BLOOD PRESSURE: 72 MMHG | HEIGHT: 65 IN | BODY MASS INDEX: 18.33 KG/M2 | OXYGEN SATURATION: 98 %

## 2023-07-09 DIAGNOSIS — M79.672 LEFT FOOT PAIN: ICD-10-CM

## 2023-07-09 DIAGNOSIS — M25.572 ACUTE LEFT ANKLE PAIN: ICD-10-CM

## 2023-07-09 DIAGNOSIS — S93.402A SPRAIN OF LEFT ANKLE, UNSPECIFIED LIGAMENT, INITIAL ENCOUNTER: ICD-10-CM

## 2023-07-09 PROCEDURE — 3078F DIAST BP <80 MM HG: CPT | Performed by: PHYSICIAN ASSISTANT

## 2023-07-09 PROCEDURE — 73610 X-RAY EXAM OF ANKLE: CPT | Mod: TC,LT | Performed by: REGISTERED NURSE

## 2023-07-09 PROCEDURE — 73630 X-RAY EXAM OF FOOT: CPT | Mod: TC,LT | Performed by: REGISTERED NURSE

## 2023-07-09 PROCEDURE — 99203 OFFICE O/P NEW LOW 30 MIN: CPT | Performed by: PHYSICIAN ASSISTANT

## 2023-07-09 PROCEDURE — 3074F SYST BP LT 130 MM HG: CPT | Performed by: PHYSICIAN ASSISTANT

## 2023-07-09 ASSESSMENT — ENCOUNTER SYMPTOMS
FEVER: 0
VOMITING: 0
CHILLS: 0
NAUSEA: 0
MYALGIAS: 1
FALLS: 1

## 2023-07-09 ASSESSMENT — FIBROSIS 4 INDEX: FIB4 SCORE: 1.499923475244191718

## 2023-07-09 NOTE — PROGRESS NOTES
"Subjective:   Jennifer Fox is a 62 y.o. female who presents for Foot Swelling (L foot )        Patient presents with left foot and ankle pain and swelling that began 3 days ago.  She states that she tripped over one of her dogs in her home.  She inverted the ankle and believes that she hit it on a table.  Since then she has had pain of the lateral ankle and at the arch of her foot.  Swelling seems to be worsening.  She has not noticed any pain or swelling in her calf or shin.  Denies numbness and tingling.  She has been applying ice and resting it without significant symptomatic relief.      Review of Systems   Constitutional:  Negative for chills and fever.   Gastrointestinal:  Negative for nausea and vomiting.   Musculoskeletal:  Positive for falls, joint pain and myalgias.       PMH:  has a past medical history of Depression, Hypercholesteremia (4/19/2023), and Migraine.    She has no past medical history of Diabetes (HCC) or Hypertension.  MEDS: No current outpatient medications on file.  ALLERGIES: No Known Allergies  SURGHX: History reviewed. No pertinent surgical history.  SOCHX:  reports that she has been smoking. She has been smoking an average of .5 packs per day. She has never used smokeless tobacco. She reports current drug use. Drug: Marijuana. She reports that she does not drink alcohol.  FH: Family history was reviewed, no pertinent findings to report   Objective:   /72 (BP Location: Left arm, Patient Position: Sitting, BP Cuff Size: Adult)   Pulse 83   Temp 36.9 °C (98.4 °F) (Temporal)   Resp 16   Ht 1.651 m (5' 5\")   Wt 49.9 kg (110 lb)   SpO2 98%   BMI 18.30 kg/m²   Physical Exam  Vitals reviewed.   Constitutional:       General: She is not in acute distress.     Appearance: Normal appearance. She is well-developed. She is not toxic-appearing.   HENT:      Head: Normocephalic and atraumatic.      Right Ear: External ear normal.      Left Ear: External ear normal.      Nose: Nose " normal.   Cardiovascular:      Rate and Rhythm: Normal rate and regular rhythm.   Pulmonary:      Effort: Pulmonary effort is normal. No respiratory distress.      Breath sounds: No stridor.   Musculoskeletal:      Comments: Left ankle/foot:  Appearance: Moderate diffuse edema of the lateral ankle and dorsal foot.  No ecchymosis.  No erythema.    Palpation: Patient is tender to palpation along lateral malleolus, ATFL, CFL, medial midfoot.  Non TTP along medial malleolus or deltoid ligament, PTFL.  No TTP base of the 5th metatarsal, MTP joints, or toes.   ROM: mildly restricted in all fields   Neurovascular: 2+ dorsalis pedis and posterior tibial.  Sensation intact to light touch         Skin:     General: Skin is dry.   Neurological:      Comments: Alert and oriented.    Psychiatric:         Speech: Speech normal.         Behavior: Behavior normal.         Imaging:  Foot:     FINDINGS:     BONE MINERALIZATION: Normal.  JOINTS: Preserved. No erosions.  FRACTURE: None.  DISLOCATION: None.  SOFT TISSUES: No mass.     IMPRESSION:     No acute osseous abnormality.    Ankle:     FINDINGS:     BONE MINERALIZATION: Normal.  JOINTS: Preserved. No erosions.  FRACTURE: None.  DISLOCATION: None.  SOFT TISSUES: No mass. Ankle swelling.     IMPRESSION:     No acute osseous abnormality.  Assessment/Plan:   1. Sprain of left ankle, unspecified ligament, initial encounter    2. Left foot pain  - DX-ANKLE 3+ VIEWS LEFT; Future  - DX-FOOT-COMPLETE 3+ LEFT; Future    3. Acute left ankle pain  - DX-ANKLE 3+ VIEWS LEFT; Future  - DX-FOOT-COMPLETE 3+ LEFT; Future    Imaging reviewed with patient.  Fortunately there is no evidence of acute bony injury.  I suspect that symptoms are secondary to a sprain.  Patient fitted with a walking boot.  May bear weight as tolerated.  Recommend frequent elevation, ice, Tylenol or NSAIDs as needed for pain.  I would like her to follow-up with her PCP later this week for reevaluation and further  management.  Recommend immediate reevaluation with any new or worsening symptoms.

## 2023-07-21 ENCOUNTER — OFFICE VISIT (OUTPATIENT)
Dept: URGENT CARE | Facility: CLINIC | Age: 63
End: 2023-07-21
Payer: MEDICAID

## 2023-07-21 VITALS
BODY MASS INDEX: 18.48 KG/M2 | TEMPERATURE: 98.9 F | SYSTOLIC BLOOD PRESSURE: 112 MMHG | RESPIRATION RATE: 18 BRPM | HEART RATE: 65 BPM | OXYGEN SATURATION: 99 % | HEIGHT: 66 IN | WEIGHT: 115 LBS | DIASTOLIC BLOOD PRESSURE: 60 MMHG

## 2023-07-21 DIAGNOSIS — S93.402A SPRAIN OF LEFT ANKLE, UNSPECIFIED LIGAMENT, INITIAL ENCOUNTER: ICD-10-CM

## 2023-07-21 PROCEDURE — 3074F SYST BP LT 130 MM HG: CPT | Performed by: STUDENT IN AN ORGANIZED HEALTH CARE EDUCATION/TRAINING PROGRAM

## 2023-07-21 PROCEDURE — 3078F DIAST BP <80 MM HG: CPT | Performed by: STUDENT IN AN ORGANIZED HEALTH CARE EDUCATION/TRAINING PROGRAM

## 2023-07-21 PROCEDURE — 99213 OFFICE O/P EST LOW 20 MIN: CPT | Performed by: STUDENT IN AN ORGANIZED HEALTH CARE EDUCATION/TRAINING PROGRAM

## 2023-07-21 ASSESSMENT — ENCOUNTER SYMPTOMS
TINGLING: 0
SENSORY CHANGE: 0
CHILLS: 0
FEVER: 0
WEAKNESS: 0

## 2023-07-21 ASSESSMENT — FIBROSIS 4 INDEX: FIB4 SCORE: 1.499923475244191718

## 2023-07-21 NOTE — PROGRESS NOTES
"Subjective     Jennifer Fox is a 62 y.o. female who presents with Foot Swelling (Left foot not improving since visit on 7/8, still swollen with no relief )            Jennifer is a 62 y.o. female who [facility]ent[facility] to urgent care for left ankle/foot swelling.  Patient states she was initially seen on 7/8 in urgent care after tripping and falling over her dog.  Patient was placed in tall walking boot and discharged in urgent wound.  Patient states that she is still experiencing swelling to the left foot/ankle.  Patient's mom was concerned that foot was broken.  Patient states imaging in clinic on 7 8 came back negative for fracture/dislocation.  Patient presents today because she is still having swelling to left foot/ankle.  Patient also reports decreased range of motion.  Patient is able to bear weight on left foot/ankle.  No radiation pain or numbness/tingling.        Review of Systems   Constitutional:  Negative for chills, fever and malaise/fatigue.   Musculoskeletal:  Positive for joint pain.   Neurological:  Negative for tingling, sensory change and weakness.   All other systems reviewed and are negative.             Objective     /60   Pulse 65   Temp 37.2 °C (98.9 °F) (Temporal)   Resp 18   Ht 1.676 m (5' 6\")   Wt 52.2 kg (115 lb)   SpO2 99%   BMI 18.56 kg/m²      Physical Exam  Vitals reviewed.   Constitutional:       Appearance: Normal appearance.   HENT:      Head: Normocephalic and atraumatic.      Nose: Nose normal.   Eyes:      Extraocular Movements: Extraocular movements intact.      Conjunctiva/sclera: Conjunctivae normal.      Pupils: Pupils are equal, round, and reactive to light.   Cardiovascular:      Rate and Rhythm: Normal rate and regular rhythm.   Pulmonary:      Effort: Pulmonary effort is normal.      Breath sounds: Normal breath sounds.   Musculoskeletal:      Right lower leg: No edema.      Left lower leg: No edema.      Left ankle: Swelling present. No deformity. Tenderness present over " the medial malleolus. Decreased range of motion (flexion/extension). Normal pulse.      Left Achilles Tendon: Jordan's test negative.      Left foot: Decreased range of motion. Normal capillary refill. No swelling, deformity, tenderness or bony tenderness. Normal pulse.   Skin:     General: Skin is warm and dry.      Capillary Refill: Capillary refill takes less than 2 seconds.   Neurological:      General: No focal deficit present.      Mental Status: She is alert. Mental status is at baseline.                 RADIOLOGY RESULTS   DX-ANKLE 3+ VIEWS LEFT    Result Date: 7/9/2023 7/9/2023 12:35 PM HISTORY/REASON FOR EXAM:  Pain following trauma. TECHNIQUE/EXAM DESCRIPTION AND NUMBER OF VIEWS:  3 views of the LEFT ankle. COMPARISON: None. FINDINGS: BONE MINERALIZATION: Normal. JOINTS: Preserved. No erosions. FRACTURE: None. DISLOCATION: None. SOFT TISSUES: No mass. Ankle swelling.     No acute osseous abnormality.    DX-FOOT-COMPLETE 3+ LEFT    Result Date: 7/9/2023 7/9/2023 12:35 PM HISTORY/REASON FOR EXAM:  Pain following trauma TECHNIQUE/EXAM DESCRIPTION AND NUMBER OF VIEWS: 3 views of the LEFT foot. COMPARISON:  None. FINDINGS: BONE MINERALIZATION: Normal. JOINTS: Preserved. No erosions. FRACTURE: None. DISLOCATION: None. SOFT TISSUES: No mass.     No acute osseous abnormality.                      Assessment & Plan          1. Sprain of left ankle, unspecified ligament, initial encounter      Initially seen in urgent care on 7/9/2023.  Denied re-imaging of left ankle/foot in clinic today.  Declined order for MRI of left ankle.  Patient will follow up with PCP.  Continue wearing walking boot.  Supportive care measures discussed.    I personally reviewed prior external notes and test results pertinent to today's visit.    DX-FOOT-COMPLETE 3+ LEFT  Result Date: 7/9/2023  No acute osseous abnormality.    DX-ANKLE 3+ VIEWS LEFT  Result Date: 7/9/2023  No acute osseous abnormality.    Differential diagnoses,  supportive care measures, and indications for immediate follow-up discussed with patient. Pathogenesis of diagnosis discussed including typical length and natural progression.      Instructed to return to urgent care or nearest emergency department if symptoms fail to improve, for any change in condition, further concerns, or new concerning symptoms.    Patient states understanding and agrees with the plan of care and discharge instructions.

## 2023-09-20 ENCOUNTER — APPOINTMENT (OUTPATIENT)
Dept: RADIOLOGY | Facility: MEDICAL CENTER | Age: 63
End: 2023-09-20
Attending: EMERGENCY MEDICINE
Payer: MEDICAID

## 2023-09-20 ENCOUNTER — HOSPITAL ENCOUNTER (EMERGENCY)
Facility: MEDICAL CENTER | Age: 63
End: 2023-09-20
Attending: EMERGENCY MEDICINE
Payer: MEDICAID

## 2023-09-20 VITALS
BODY MASS INDEX: 19.91 KG/M2 | HEIGHT: 65 IN | DIASTOLIC BLOOD PRESSURE: 78 MMHG | TEMPERATURE: 98 F | OXYGEN SATURATION: 97 % | WEIGHT: 119.49 LBS | SYSTOLIC BLOOD PRESSURE: 140 MMHG | HEART RATE: 91 BPM | RESPIRATION RATE: 16 BRPM

## 2023-09-20 DIAGNOSIS — M25.572 ACUTE LEFT ANKLE PAIN: ICD-10-CM

## 2023-09-20 PROCEDURE — 99284 EMERGENCY DEPT VISIT MOD MDM: CPT

## 2023-09-20 PROCEDURE — 73610 X-RAY EXAM OF ANKLE: CPT | Mod: LT

## 2023-09-20 ASSESSMENT — FIBROSIS 4 INDEX: FIB4 SCORE: 1.52

## 2023-09-20 NOTE — ED TRIAGE NOTES
"Chief Complaint   Patient presents with    Foot Pain     X2 months. Patient has swelling to left ankle and pain. She states she tripped over her dog and had been wearing a boot but she has not seen any improvement.        Patient to triage ambulatory with a steady gait, AAOx4, Appropriate precautions in place.     Explained wait time and triage process. Placed back in lobby. Told to notify ED tech or RN of any changes, verbalized understanding.    /78   Pulse 81   Temp 36.4 °C (97.6 °F) (Temporal)   Resp 16   Ht 1.651 m (5' 5\")   Wt 54.2 kg (119 lb 7.8 oz)   SpO2 98%   BMI 19.88 kg/m²     "

## 2023-09-20 NOTE — DISCHARGE PLANNING
ER  Note:  Received call from ERP about pt assistance with ortho appointment. Ortho referral placed by ERP. RN CM spoke to pt. Pt confirmed her contact number [phone] and insurance per facesheet. Informed pt that referral department will process referral and will send referral to contracted clinic, then pt will get a call to schedule an appointment. Encouraged pt to call back if there is any issue. RN CM called the referral department, spoke to Nora. Per Nora, they received the referral and will process the referral.

## 2023-09-20 NOTE — ED NOTES
"Discharge orders received. Patient educated on \"After Visit Summary\" by ERP and patient verbalized understanding without further questions. All belongings sent with patient upon departure from ED. Patient ambulated with a steady gait out of ED.   "

## 2023-09-20 NOTE — DISCHARGE INSTRUCTIONS
Rest, use walking boot.  Return for pain, swelling, redness or other concerns.  Follow-up with your doctor.  Follow-up with orthopedics.  Placed a referral to the  should call you and help you arrange this follow-up.

## 2023-09-20 NOTE — ED PROVIDER NOTES
"ED Provider Note    CHIEF COMPLAINT  Chief Complaint   Patient presents with    Foot Pain     X2 months. Patient has swelling to left ankle and pain. She states she tripped over her dog and had been wearing a boot but she has not seen any improvement.        EXTERNAL RECORDS REVIEWED  Reviewed previous outpatient x-ray from July of this year.    HPI/ROS  LIMITATION TO HISTORY   Select: : None      Jennifer Fox is a 63 y.o. female who presents to the emergency department for ankle pain.  Patient injured her ankle about 2 months ago.  The patient was seen at the urgent care had negative x-ray she been wearing a boot.  She had persistent pain and swelling over the ankle.  No other injuries or complaints.  No falls since that time.    PAST MEDICAL HISTORY   has a past medical history of Depression, Hypercholesteremia (4/19/2023), and Migraine.    SURGICAL HISTORY  patient denies any surgical history    FAMILY HISTORY  Family History   Problem Relation Age of Onset    Heart Attack Maternal Grandmother     Hypertension Maternal Grandmother        SOCIAL HISTORY  Social History     Tobacco Use    Smoking status: Every Day     Current packs/day: 0.50     Types: Cigarettes    Smokeless tobacco: Never   Vaping Use    Vaping Use: Never used   Substance and Sexual Activity    Alcohol use: No    Drug use: Yes     Types: Marijuana    Sexual activity: Not on file       CURRENT MEDICATIONS  Home Medications       Reviewed by Phuong Lazo R.N. (Registered Nurse) on 09/20/23 at 0907  Med List Status: Partial     Medication Last Dose Status        Patient Francisco Javire Taking any Medications                           ALLERGIES  No Known Allergies    PHYSICAL EXAM  VITAL SIGNS: /77   Pulse 69   Temp 36.4 °C (97.6 °F) (Temporal)   Resp 16   Ht 1.651 m (5' 5\")   Wt 54.2 kg (119 lb 7.8 oz)   SpO2 98%   BMI 19.88 kg/m²    \Left ankle tender and swollen over the lateral malleolus and just distal to this over the anterior talar " ligament.  No other tenderness.  Achilles appears intact.  No foot tenderness besides described.  Good pulses and perfusion normal strength and sensation.    DIAGNOSTIC STUDIES / PROCEDURES      RADIOLOGY  I have independently interpreted the diagnostic imaging associated with this visit and am waiting the final reading from the radiologist.   My preliminary interpretation is as follows: I have reviewed the x-ray results and agree with radiologist interpretation.  Radiologist interpretation:     DX-ANKLE 3+ VIEWS LEFT   Final Result      No radiographic evidence of acute traumatic injury.            COURSE & MEDICAL DECISION MAKING    ED Observation Status? No; Patient does not meet criteria for ED Observation.     INITIAL ASSESSMENT, COURSE AND PLAN  Care Narrative:       Patient presents with ankle pain.  His pain swelling over the lateral malleolus and anterior talofibular ligament.  She has good pulses and a normal neurovascular exam there is no evidence of DVT, or ischemia.  She has good range of motion.  The joint itself is not red hot or swollen there is no signs of inflammatory or infectious arthropathy.  This appears to be an ankle injury or musculoskeletal in nature.  X-rays not show fracture.  I think the plan is still the same with rest immobilization and follow-up with orthopedics.    Patient is placed back in a short walking boot and given crutches if she would like.    The patient expressed the fact that she has been having trouble seeing orthopedics.  Referral was placed.  Spoke with case management.  They will reach out to the scheduling to make sure her insurance is taken by an orthopedic group and make sure she gets an appointment.  I explained this to the patient and she seems satisfied.  This point that she can go home for further work-up treatment as an outpatient.  She is discharged in good condition.    DISPOSITION AND DISCUSSIONS    Nayeli Elizabeth M.D.  [address] [place]  [address]  [phone]          Bahman Willis M.D.  [address] [facility] [id number] [phone]    Schedule an appointment as soon as possible for a visit in 3 days          FINAL DIAGNOSIS  1. Acute left ankle pain           Electronically signed by: Jaspreet Tapia M.D., 9/20/2023 9:43 AM